# Patient Record
Sex: MALE | Race: WHITE | NOT HISPANIC OR LATINO | URBAN - NONMETROPOLITAN AREA
[De-identification: names, ages, dates, MRNs, and addresses within clinical notes are randomized per-mention and may not be internally consistent; named-entity substitution may affect disease eponyms.]

---

## 2019-07-16 ENCOUNTER — IMPORTED ENCOUNTER (OUTPATIENT)
Dept: URBAN - NONMETROPOLITAN AREA CLINIC 1 | Facility: CLINIC | Age: 79
End: 2019-07-16

## 2019-07-16 PROBLEM — H52.223: Noted: 2019-07-16

## 2019-07-16 PROBLEM — Z96.1: Noted: 2019-07-16

## 2019-07-16 PROBLEM — E11.9: Noted: 2019-07-16

## 2019-07-16 PROBLEM — H43.812: Noted: 2019-07-16

## 2019-07-16 PROBLEM — H25.11: Noted: 2018-06-07

## 2019-07-16 PROBLEM — H52.4: Noted: 2019-07-16

## 2019-07-16 PROBLEM — H35.372: Noted: 2019-07-16

## 2019-07-16 PROCEDURE — 92014 COMPRE OPH EXAM EST PT 1/>: CPT

## 2019-07-16 PROCEDURE — 92134 CPTRZ OPH DX IMG PST SGM RTA: CPT

## 2019-07-16 NOTE — PATIENT DISCUSSION
Cataract OD:- Discussed diagnosis in detail with patient- Discussed signs and symptoms of progression- Discussed UV protection- Patient had concerns about wanting the astigmatism corrected discussed different treatment options with patient. - Recommend cataract eval with Dr. Lezlie Siemens patient still defers treatment at this time. Patient states that he has sick family memebers that needs taken care of first but will call when he is ready for evaulation - Continue to monitor- RTC 1 year with BAT P/C IOL OS:-  Stable and doing well. -  Had cataract surgery done by Dr. Nicola Issa possible LRI. -  Stable IOL noted. -  Monitor PRN ERM OS:  -  Educated patient on findings today. -  Signs/symptoms associated with possible progression discussed. -  Appears stable from previous visit (12/2014). -  OCT done today shows ERM OS but stable from previous -  Will continue to monitor for changes. Type II DM-  Discussed diagnosis in detail with patient -  Stressed importance of good blood sugar control-  Recommend no sodas -  No diabetic retinopathy noted on exam today-  Continue to monitor PVD OS:-  Educated patient on diagnosis today. -  Signs/symptoms of RD discussed today call or come in ASAP if changes are noted. -  Monitor for changes. Papilloma Lower Lid OD:-  Educated patient on findings today-  Patient to monitor himself as well and let us know if causing irritation or wants to have removed.  -  Continue to monitor Presbyopia OU -  Discussed diagnosis in detail with patient -  New glasses Rx given today-  Continue to monitor; Dr's Notes: OD - moderate narrow ok to dilateMR  12/31/14DFE DEFERRED 7/16/19 OptosOCT 7/16/19

## 2021-09-14 ENCOUNTER — IMPORTED ENCOUNTER (OUTPATIENT)
Dept: URBAN - NONMETROPOLITAN AREA CLINIC 1 | Facility: CLINIC | Age: 81
End: 2021-09-14

## 2021-09-14 PROCEDURE — 99214 OFFICE O/P EST MOD 30 MIN: CPT

## 2021-09-14 PROCEDURE — 92015 DETERMINE REFRACTIVE STATE: CPT

## 2021-09-14 PROCEDURE — 92134 CPTRZ OPH DX IMG PST SGM RTA: CPT

## 2021-09-14 NOTE — PATIENT DISCUSSION
Cataract OD:- Discussed diagnosis in detail with patient- Discussed signs and symptoms of progression- Discussed UV protection- Patient had concerns about wanting the astigmatism corrected discussed different treatment options with patient. - Recommend cataract eval with Dr. Evelio Willis patient still defers treatment at this time. - Continue to monitor- RTC 1 year with BAT P/C IOL OS:-  Stable and doing well. -  Had cataract surgery done by Dr. Lisa Orr possible LRI. -  Stable IOL noted. -  Monitor PRN ERM OS:  -  Educated patient on findings today. -  Signs/symptoms associated with possible progression discussed. -  Appears stable from previous visit (12/2014). -  OCT done today shows ERM OS but stable from previous -  Will continue to monitor for changes. Type II DM-  Discussed diagnosis in detail with patient -  Stressed importance of good blood sugar control-  Recommend no sodas -  No diabetic retinopathy noted on exam today-  Continue to monitor PVD OS:-  Educated patient on diagnosis today. -  Signs/symptoms of RD discussed today call or come in ASAP if changes are noted. -  Monitor for changes. Papilloma Lower Lid OD:-  Educated patient on findings today-  Patient to monitor himself as well and let us know if causing irritation or wants to have removed.  -  Continue to monitor Presbyopia OU -  Discussed diagnosis in detail with patient -  New glasses Rx given today-  Continue to monitor; Dr's Notes: OD - moderate narrow ok to dilateMR  12/31/14DFE DEFERRED 7/16/19 OptosOCT 7/16/19

## 2022-04-09 ASSESSMENT — VISUAL ACUITY
OS_SC: 20/30
OD_GLARE: 20/60
OD_GLARE: 20/50
OD_SC: 20/30
OS_PH: 20/30
OD_CC: J3
OS_GLARE: 20/50
OD_PH: 20/30
OS_CC: J2

## 2022-04-09 ASSESSMENT — TONOMETRY
OD_IOP_MMHG: 16
OS_IOP_MMHG: 16
OD_IOP_MMHG: 17
OS_IOP_MMHG: 17